# Patient Record
Sex: MALE | Race: WHITE | NOT HISPANIC OR LATINO
[De-identification: names, ages, dates, MRNs, and addresses within clinical notes are randomized per-mention and may not be internally consistent; named-entity substitution may affect disease eponyms.]

---

## 2020-11-30 ENCOUNTER — TRANSCRIPTION ENCOUNTER (OUTPATIENT)
Age: 60
End: 2020-11-30

## 2020-12-13 ENCOUNTER — TRANSCRIPTION ENCOUNTER (OUTPATIENT)
Age: 60
End: 2020-12-13

## 2020-12-25 ENCOUNTER — TRANSCRIPTION ENCOUNTER (OUTPATIENT)
Age: 60
End: 2020-12-25

## 2021-03-10 ENCOUNTER — TRANSCRIPTION ENCOUNTER (OUTPATIENT)
Age: 61
End: 2021-03-10

## 2021-12-06 ENCOUNTER — APPOINTMENT (OUTPATIENT)
Dept: NEUROSURGERY | Facility: CLINIC | Age: 61
End: 2021-12-06
Payer: COMMERCIAL

## 2021-12-06 PROBLEM — Z00.00 ENCOUNTER FOR PREVENTIVE HEALTH EXAMINATION: Status: ACTIVE | Noted: 2021-12-06

## 2021-12-06 PROCEDURE — 99072 ADDL SUPL MATRL&STAF TM PHE: CPT

## 2021-12-06 PROCEDURE — 99205 OFFICE O/P NEW HI 60 MIN: CPT

## 2022-03-09 DIAGNOSIS — G93.89 OTHER SPECIFIED DISORDERS OF BRAIN: ICD-10-CM

## 2022-06-06 ENCOUNTER — APPOINTMENT (OUTPATIENT)
Dept: NEUROSURGERY | Facility: CLINIC | Age: 62
End: 2022-06-06
Payer: COMMERCIAL

## 2022-06-06 PROCEDURE — 99215 OFFICE O/P EST HI 40 MIN: CPT

## 2022-06-06 PROCEDURE — 99072 ADDL SUPL MATRL&STAF TM PHE: CPT

## 2023-06-05 ENCOUNTER — APPOINTMENT (OUTPATIENT)
Dept: NEUROSURGERY | Facility: CLINIC | Age: 63
End: 2023-06-05
Payer: COMMERCIAL

## 2023-06-05 PROCEDURE — 99215 OFFICE O/P EST HI 40 MIN: CPT

## 2023-06-23 ENCOUNTER — RESULT REVIEW (OUTPATIENT)
Age: 63
End: 2023-06-23

## 2024-01-08 ENCOUNTER — APPOINTMENT (OUTPATIENT)
Dept: UROLOGY | Facility: CLINIC | Age: 64
End: 2024-01-08
Payer: COMMERCIAL

## 2024-01-08 VITALS
BODY MASS INDEX: 37.75 KG/M2 | HEIGHT: 76 IN | SYSTOLIC BLOOD PRESSURE: 111 MMHG | HEART RATE: 64 BPM | WEIGHT: 310 LBS | DIASTOLIC BLOOD PRESSURE: 73 MMHG

## 2024-01-08 DIAGNOSIS — G20.A1 PARKINSON'S DISEASE WITHOUT DYSKINESIA, WITHOUT MENTION OF FLUCTUATIONS: ICD-10-CM

## 2024-01-08 PROCEDURE — 99204 OFFICE O/P NEW MOD 45 MIN: CPT

## 2024-01-08 RX ORDER — IPRATROPIUM BROMIDE 17 UG/1
AEROSOL, METERED RESPIRATORY (INHALATION)
Refills: 0 | Status: ACTIVE | COMMUNITY

## 2024-01-08 RX ORDER — ALPRAZOLAM 0.5 MG/1
0.5 TABLET ORAL
Qty: 2 | Refills: 0 | Status: DISCONTINUED | COMMUNITY
Start: 2022-03-09 | End: 2024-01-08

## 2024-01-08 RX ORDER — FLUTICASONE PROPION/SALMETEROL 500-50 MCG
BLISTER, WITH INHALATION DEVICE INHALATION
Refills: 0 | Status: ACTIVE | COMMUNITY

## 2024-01-08 RX ORDER — ALBUTEROL SULFATE 90 UG/1
AEROSOL, METERED RESPIRATORY (INHALATION)
Refills: 0 | Status: ACTIVE | COMMUNITY

## 2024-01-08 RX ORDER — MONTELUKAST 10 MG/1
TABLET, FILM COATED ORAL
Refills: 0 | Status: ACTIVE | COMMUNITY

## 2024-01-08 RX ORDER — ROTIGOTINE 8 MG/24H
PATCH, EXTENDED RELEASE TRANSDERMAL
Refills: 0 | Status: ACTIVE | COMMUNITY

## 2024-01-08 RX ORDER — TAMSULOSIN HYDROCHLORIDE 0.4 MG/1
CAPSULE ORAL
Refills: 0 | Status: ACTIVE | COMMUNITY

## 2024-01-08 NOTE — PHYSICAL EXAM
[General Appearance - Well Developed] : well developed [Normal Appearance] : normal appearance [General Appearance - In No Acute Distress] : no acute distress [Respiration, Rhythm And Depth] : normal respiratory rhythm and effort [Anus Abnormality] : the anus and perineum were normal [Rectal Exam - Rectum] : digital rectal exam was normal [Prostate Tenderness] : the prostate was not tender [No Prostate Nodules] : no prostate nodules [Prostate Size ___ gm] : prostate size [unfilled] gm [] : no rash [Oriented To Time, Place, And Person] : oriented to person, place, and time

## 2024-01-08 NOTE — ASSESSMENT
[FreeTextEntry1] : Patient is a 64-year-old male with history of elevated PSA, left flank pain due to large left renal cyst and BPH with obstruction.  He has voiding dysfunction due to his large prostate and is currently on tamsulosin 0.4 mg daily.  He was on anticholinergic but stopped due to side effects.  He does have Parkinson's symptoms but has never had a baseline urodynamic study to assess his bladder function.  In addition, he has had elevated PSA that fluctuates from high to normal.  He has not had a prostate biopsy but has had MRI of the prostate but states that it was normal.  Finally, he has a left renal cyst large up to 11 cm that was perky is continuously drained by Dr. Ochoa at [facility] but has reaccumulated fluid and now is experiencing pain again.  Denies any gross hematuria, dysuria or pelvic pain.  His digital rectal exam today is normal.  Assessment and plan 1.  Left renal cyst up to 11 cm in diameter status post percutaneous drainage with injection of sclerosing agent by Dr. Ochoa at [facility] 6 months ago.  He has a recent renal ultrasound that documents that the cyst is reaccumulating and I will get a renal ultrasound today to see where we are at this point. 2.  Elevated PSA I will get a PSA today but certainly get all his old records and old PSAs as well as his MRI of the prostate for me to review. 3.  BPH with obstruction/voiding dysfunction Almost certainly a combination of his enlarged prostate as well as Parkinson's symptoms he will continue the tamsulosin for now and I would like to review he may need a workup with cystoscopy and urodynamics to completely assess the situation.  I will call the patient once I get the renal ultrasound, PSA results and review all his old records.  Greater than 45 minutes was spent in review of outside data and discussion and examination of the patient.

## 2024-01-09 LAB — PSA SERPL-MCNC: 6.71 NG/ML

## 2024-02-08 ENCOUNTER — RESULT REVIEW (OUTPATIENT)
Age: 64
End: 2024-02-08

## 2024-02-29 ENCOUNTER — APPOINTMENT (OUTPATIENT)
Dept: UROLOGY | Facility: CLINIC | Age: 64
End: 2024-02-29
Payer: COMMERCIAL

## 2024-02-29 PROCEDURE — 99441: CPT

## 2024-03-11 ENCOUNTER — APPOINTMENT (OUTPATIENT)
Dept: UROLOGY | Facility: CLINIC | Age: 64
End: 2024-03-11
Payer: COMMERCIAL

## 2024-03-11 PROCEDURE — 99441: CPT

## 2024-04-18 ENCOUNTER — APPOINTMENT (OUTPATIENT)
Dept: UROLOGY | Facility: CLINIC | Age: 64
End: 2024-04-18
Payer: COMMERCIAL

## 2024-04-18 VITALS
SYSTOLIC BLOOD PRESSURE: 129 MMHG | DIASTOLIC BLOOD PRESSURE: 80 MMHG | HEART RATE: 60 BPM | BODY MASS INDEX: 37.75 KG/M2 | HEIGHT: 76 IN | WEIGHT: 310 LBS

## 2024-04-18 DIAGNOSIS — N40.1 BENIGN PROSTATIC HYPERPLASIA WITH LOWER URINARY TRACT SYMPMS: ICD-10-CM

## 2024-04-18 DIAGNOSIS — N28.1 CYST OF KIDNEY, ACQUIRED: ICD-10-CM

## 2024-04-18 DIAGNOSIS — R97.20 ELEVATED PROSTATE, SPECIFIC ANTIGEN [PSA]: ICD-10-CM

## 2024-04-18 DIAGNOSIS — N13.8 BENIGN PROSTATIC HYPERPLASIA WITH LOWER URINARY TRACT SYMPMS: ICD-10-CM

## 2024-04-18 PROCEDURE — 99214 OFFICE O/P EST MOD 30 MIN: CPT

## 2024-04-18 RX ORDER — FINASTERIDE 5 MG/1
5 TABLET, FILM COATED ORAL DAILY
Qty: 90 | Refills: 3 | Status: ACTIVE | COMMUNITY
Start: 2024-02-29 | End: 1900-01-01

## 2024-04-18 NOTE — ASSESSMENT
[FreeTextEntry1] : Patient is a 64-year-old male with history of elevated PSA, left flank pain due to large left renal cyst and BPH with obstruction. He has voiding dysfunction due to his large prostate and is currently on tamsulosin 0.4 mg daily. He had an MRI due to the elevated PSA which showed 114 g prostate and no lesions.  I added finasteride 5 mg p.o. daily 3 months ago.  He has noted improvement in stream and decreased nocturia but is not interested in taking the medication long-term. He does have Parkinson's.  Finally, he has a left renal cyst large up to 11 cm that was percutaneously drained by Dr. Ochoa at [facility] but has reaccumulated fluid and now is experiencing pain again. Denies any gross hematuria, dysuria or pelvic pain. His digital rectal exam today is normal.  Assessment and plan 1. Left renal cyst up to 11 cm in diameter status post percutaneous drainage with injection of sclerosing agent by Dr. Ochoa at [facility] 9 months ago.  Renal ultrasound is ordered to evaluate will call with the results. 2. Elevated PSA MRI reviewed with the patient again, no lesions will recheck PSA when he has been on the finasteride over 6 months. 3. BPH with obstruction/voiding dysfunction Will continue finasteride 5 mg p.o. daily and I gave him prostatic artery embolization information and he will call with questions.  Patient will follow-up in 3 months.

## 2024-07-18 ENCOUNTER — APPOINTMENT (OUTPATIENT)
Dept: UROLOGY | Facility: CLINIC | Age: 64
End: 2024-07-18
Payer: COMMERCIAL

## 2024-07-18 VITALS
BODY MASS INDEX: 37.75 KG/M2 | WEIGHT: 310 LBS | DIASTOLIC BLOOD PRESSURE: 75 MMHG | SYSTOLIC BLOOD PRESSURE: 118 MMHG | HEART RATE: 72 BPM | HEIGHT: 76 IN

## 2024-07-18 DIAGNOSIS — R97.20 ELEVATED PROSTATE, SPECIFIC ANTIGEN [PSA]: ICD-10-CM

## 2024-07-18 DIAGNOSIS — N40.1 BENIGN PROSTATIC HYPERPLASIA WITH LOWER URINARY TRACT SYMPMS: ICD-10-CM

## 2024-07-18 DIAGNOSIS — N13.8 BENIGN PROSTATIC HYPERPLASIA WITH LOWER URINARY TRACT SYMPMS: ICD-10-CM

## 2024-07-18 DIAGNOSIS — N28.1 CYST OF KIDNEY, ACQUIRED: ICD-10-CM

## 2024-07-18 PROCEDURE — 99214 OFFICE O/P EST MOD 30 MIN: CPT

## 2024-07-23 NOTE — PHYSICAL EXAM
[General Appearance - Alert] : alert [General Appearance - In No Acute Distress] : in no acute distress [Oriented To Time, Place, And Person] : oriented to person, place, and time [Cranial Nerves Optic (II)] : visual acuity intact bilaterally,  pupils equal round and reactive to light [Cranial Nerves Oculomotor (III)] : extraocular motion intact [Cranial Nerves Trigeminal (V)] : facial sensation intact symmetrically [Cranial Nerves Facial (VII)] : face symmetrical [Cranial Nerves Vestibulocochlear (VIII)] : hearing was intact bilaterally [Cranial Nerves Glossopharyngeal (IX)] : tongue and palate midline [Cranial Nerves Accessory (XI - Cranial And Spinal)] : head turning and shoulder shrug symmetric [Cranial Nerves Hypoglossal (XII)] : there was no tongue deviation with protrusion [PERRL With Normal Accommodation] : pupils were equal in size, round, reactive to light, with normal accommodation [Extraocular Movements] : extraocular movements were intact [Full Visual Field] : full visual field

## 2024-07-25 ENCOUNTER — APPOINTMENT (OUTPATIENT)
Dept: NEUROSURGERY | Facility: CLINIC | Age: 64
End: 2024-07-25

## 2024-07-25 ENCOUNTER — APPOINTMENT (OUTPATIENT)
Dept: NEUROSURGERY | Facility: CLINIC | Age: 64
End: 2024-07-25
Payer: COMMERCIAL

## 2024-07-25 PROCEDURE — 99215 OFFICE O/P EST HI 40 MIN: CPT

## 2024-07-25 PROCEDURE — G2211 COMPLEX E/M VISIT ADD ON: CPT

## 2024-07-25 NOTE — ASSESSMENT
[FreeTextEntry1] : PRAVIN COLEMAN is a 64 year male who presents with a history of an incidental finding of a left frontal vertex, parasagittal meningioma. Surveillance MRI brain with and without gadolinium 6/20/2024 reviewed independently by me demonstrates no change in size, morphology, or signal characteristics of the homogeneously enhancing, left frontal parasagittal extra axial mass lesion when compared to imaging from 5/20/22 and 5/4/2023.  Findings are most consistent with a stable meningioma. There is no evidence for significant mass effect or for subjacent vasogenic edema. Neurosurgical intervention is not recommended at this time. I plan to obtain a surveillance MRI brain and without gadolinium to include frameless protocol in one year with an appointment to follow.   I have asked the patient to contact me for any symptomatic development or progression in the interim at which time we can obtain expedited follow up imaging.   A total of 45 minutes were spent relative to this encounter.

## 2024-07-25 NOTE — DATA REVIEWED
[de-identified] : Performing Location: [facility]    Verified By: Murali Lopez 06/20/2024 8:31 pm  Result Date: 06/20/2024 8:31 pm   Test Status: Complete   Test Accession #: [id number]  MR BRAIN WITH + WITHOUT IV CONTRAST    MRI BRAIN WITHOUT AND WITH CONTRAST     Study date: 6/20/2024     Indication: Follow-up meningioma     Technique:  Sagittal T1, sagittal FLAIR, coronal T2, axial diffusion weighted,  axial T1, axial T2, axial T2 FLAIR and axial GRE weighted images of the brain  were obtained. Postcontrast axial and coronal T1 images were subsequently  obtained. All images were acquired on a 1.5 Keila MRI system.  20 cc ProHance  was administered.  0 cc was discarded.     Comparison: MRI brain 5/4/2023     Findings:     Enhancing extra-axial lesion at the left frontal vertex measuring approximately  1.3 x 1.1 x 1.1 cm, not significant change from prior exam. There is no  significant mass effect on the underlying parenchyma.     Minimal punctate T2/FLAIR signal hyperintensities scattered about the  supratentorial white matter. No abnormal parenchymal enhancement. No restricted  diffusion to suggest acute infarct.  No acute intracranial hemorrhage or extra  axial collection.     Sulcal markings are within normal limits for patient's age.  No hydrocephalus.   Nonenhancing 2-3 mm FLAIR hyperintense nodule along the ependymal surface of the  body of the right lateral ventricle (axial FLAIR image 16, sagittal FLAIR image  12), not significantly changed from 2021.     No mass effect, midline shift or herniation.  Sellar/suprasellar structures are  grossly within normal limits.     Major intracranial vascular flow voids are grossly present.     Orbits appear preserved and symmetric.     Minimal mucosal thickening anterior ethmoid air cells. Small retention cysts  suggested in the left maxillary sinus. Mastoid air cells appear relatively well  aerated.     IMPRESSION:   Stable left frontal vertex meningioma.

## 2024-07-25 NOTE — DATA REVIEWED
[de-identified] : Performing Location: [facility]    Verified By: Murali Lopez 06/20/2024 8:31 pm  Result Date: 06/20/2024 8:31 pm   Test Status: Complete   Test Accession #: [id number]  MR BRAIN WITH + WITHOUT IV CONTRAST    MRI BRAIN WITHOUT AND WITH CONTRAST     Study date: 6/20/2024     Indication: Follow-up meningioma     Technique:  Sagittal T1, sagittal FLAIR, coronal T2, axial diffusion weighted,  axial T1, axial T2, axial T2 FLAIR and axial GRE weighted images of the brain  were obtained. Postcontrast axial and coronal T1 images were subsequently  obtained. All images were acquired on a 1.5 Keila MRI system.  20 cc ProHance  was administered.  0 cc was discarded.     Comparison: MRI brain 5/4/2023     Findings:     Enhancing extra-axial lesion at the left frontal vertex measuring approximately  1.3 x 1.1 x 1.1 cm, not significant change from prior exam. There is no  significant mass effect on the underlying parenchyma.     Minimal punctate T2/FLAIR signal hyperintensities scattered about the  supratentorial white matter. No abnormal parenchymal enhancement. No restricted  diffusion to suggest acute infarct.  No acute intracranial hemorrhage or extra  axial collection.     Sulcal markings are within normal limits for patient's age.  No hydrocephalus.   Nonenhancing 2-3 mm FLAIR hyperintense nodule along the ependymal surface of the  body of the right lateral ventricle (axial FLAIR image 16, sagittal FLAIR image  12), not significantly changed from 2021.     No mass effect, midline shift or herniation.  Sellar/suprasellar structures are  grossly within normal limits.     Major intracranial vascular flow voids are grossly present.     Orbits appear preserved and symmetric.     Minimal mucosal thickening anterior ethmoid air cells. Small retention cysts  suggested in the left maxillary sinus. Mastoid air cells appear relatively well  aerated.     IMPRESSION:   Stable left frontal vertex meningioma.

## 2024-07-25 NOTE — REASON FOR VISIT
[FreeTextEntry1] :  PRAVIN COLEMAN is a 64 year male seen today in neurosurgery follow up for imaging review of his incidentally found meningioma. Previous notes and interval history reviewed. He has been diagnosed with Parkinson's Disease and has been following with Dr. Anne Sullivan at [facility] and was started on neupro and follows with her every 6 months.  He is receiving botox injections to help with the increased salivary secretions.  Surveillance MRI detailed below. Denies other neurological symptoms at this time.

## 2024-07-25 NOTE — REASON FOR VISIT
[FreeTextEntry1] :  PRAVIN COLEMAN is a 64 year male seen today in neurosurgery follow up for imaging review of his incidentally found meningioma. Previous notes and interval history reviewed. He has been diagnosed with Parkinson's Disease and has been following with Dr. Anne Sullivan at [facility] and was started on neupro and follows with her every 6 months.  He is receiving botox injections to help with the increased salivary secretions.  Surveillance MRI detailed below. Denies other neurological symptoms at this time.

## 2024-07-29 ENCOUNTER — NON-APPOINTMENT (OUTPATIENT)
Age: 64
End: 2024-07-29

## 2024-08-06 ENCOUNTER — APPOINTMENT (OUTPATIENT)
Dept: UROLOGY | Facility: CLINIC | Age: 64
End: 2024-08-06

## 2024-08-06 PROCEDURE — 99024 POSTOP FOLLOW-UP VISIT: CPT

## 2024-08-22 ENCOUNTER — RESULT REVIEW (OUTPATIENT)
Age: 64
End: 2024-08-22

## 2024-08-26 ENCOUNTER — APPOINTMENT (OUTPATIENT)
Dept: UROLOGY | Facility: CLINIC | Age: 64
End: 2024-08-26

## 2024-08-26 VITALS
HEART RATE: 60 BPM | DIASTOLIC BLOOD PRESSURE: 83 MMHG | BODY MASS INDEX: 37.75 KG/M2 | HEIGHT: 76 IN | SYSTOLIC BLOOD PRESSURE: 127 MMHG | TEMPERATURE: 97.9 F | WEIGHT: 310 LBS

## 2024-08-26 PROCEDURE — 99213 OFFICE O/P EST LOW 20 MIN: CPT

## 2024-09-23 ENCOUNTER — RESULT REVIEW (OUTPATIENT)
Age: 64
End: 2024-09-23

## 2024-10-03 ENCOUNTER — RESULT REVIEW (OUTPATIENT)
Age: 64
End: 2024-10-03

## 2024-10-14 ENCOUNTER — RESULT REVIEW (OUTPATIENT)
Age: 64
End: 2024-10-14

## 2024-10-18 ENCOUNTER — TRANSCRIPTION ENCOUNTER (OUTPATIENT)
Age: 64
End: 2024-10-18

## 2024-10-21 ENCOUNTER — APPOINTMENT (OUTPATIENT)
Dept: UROLOGY | Facility: CLINIC | Age: 64
End: 2024-10-21

## 2025-01-21 ENCOUNTER — TRANSCRIPTION ENCOUNTER (OUTPATIENT)
Age: 65
End: 2025-01-21

## 2025-03-13 ENCOUNTER — TRANSCRIPTION ENCOUNTER (OUTPATIENT)
Age: 65
End: 2025-03-13

## 2025-07-22 ENCOUNTER — NON-APPOINTMENT (OUTPATIENT)
Age: 65
End: 2025-07-22

## 2025-07-22 ENCOUNTER — APPOINTMENT (OUTPATIENT)
Dept: NEUROSURGERY | Facility: CLINIC | Age: 65
End: 2025-07-22
Payer: COMMERCIAL

## 2025-07-22 PROCEDURE — 99215 OFFICE O/P EST HI 40 MIN: CPT

## 2025-07-22 PROCEDURE — G2211 COMPLEX E/M VISIT ADD ON: CPT | Mod: NC
